# Patient Record
Sex: FEMALE | Race: BLACK OR AFRICAN AMERICAN
[De-identification: names, ages, dates, MRNs, and addresses within clinical notes are randomized per-mention and may not be internally consistent; named-entity substitution may affect disease eponyms.]

---

## 2018-04-20 ENCOUNTER — HOSPITAL ENCOUNTER (EMERGENCY)
Dept: HOSPITAL 17 - PHED | Age: 38
Discharge: HOME | End: 2018-04-20
Payer: MEDICAID

## 2018-04-20 VITALS — OXYGEN SATURATION: 98 %

## 2018-04-20 VITALS — WEIGHT: 191.8 LBS | HEIGHT: 62 IN | BODY MASS INDEX: 35.3 KG/M2

## 2018-04-20 VITALS
DIASTOLIC BLOOD PRESSURE: 64 MMHG | RESPIRATION RATE: 18 BRPM | HEART RATE: 89 BPM | OXYGEN SATURATION: 100 % | SYSTOLIC BLOOD PRESSURE: 110 MMHG

## 2018-04-20 VITALS
OXYGEN SATURATION: 100 % | RESPIRATION RATE: 16 BRPM | HEART RATE: 67 BPM | TEMPERATURE: 98.3 F | SYSTOLIC BLOOD PRESSURE: 120 MMHG | DIASTOLIC BLOOD PRESSURE: 59 MMHG

## 2018-04-20 VITALS — DIASTOLIC BLOOD PRESSURE: 68 MMHG | SYSTOLIC BLOOD PRESSURE: 128 MMHG

## 2018-04-20 DIAGNOSIS — Z79.899: ICD-10-CM

## 2018-04-20 DIAGNOSIS — N93.8: Primary | ICD-10-CM

## 2018-04-20 DIAGNOSIS — Z88.6: ICD-10-CM

## 2018-04-20 DIAGNOSIS — Z88.5: ICD-10-CM

## 2018-04-20 DIAGNOSIS — D64.9: ICD-10-CM

## 2018-04-20 LAB
ALBUMIN SERPL-MCNC: 3.4 GM/DL (ref 3.4–5)
ALP SERPL-CCNC: 63 U/L (ref 45–117)
ALT SERPL-CCNC: 27 U/L (ref 10–53)
AST SERPL-CCNC: 24 U/L (ref 15–37)
BASOPHILS # BLD AUTO: 0 TH/MM3 (ref 0–0.2)
BASOPHILS NFR BLD: 0.4 % (ref 0–2)
BILIRUB SERPL-MCNC: (no result) MG/DL (ref 0.2–1)
BUN SERPL-MCNC: 15 MG/DL (ref 7–18)
CALCIUM SERPL-MCNC: 8.6 MG/DL (ref 8.5–10.1)
CHLORIDE SERPL-SCNC: 107 MEQ/L (ref 98–107)
CREAT SERPL-MCNC: 0.71 MG/DL (ref 0.5–1)
EOSINOPHIL # BLD: 0.4 TH/MM3 (ref 0–0.4)
EOSINOPHIL NFR BLD: 4.9 % (ref 0–4)
ERYTHROCYTE [DISTWIDTH] IN BLOOD BY AUTOMATED COUNT: 17.4 % (ref 11.6–17.2)
GFR SERPLBLD BASED ON 1.73 SQ M-ARVRAT: 111 ML/MIN (ref 89–?)
GLUCOSE SERPL-MCNC: 85 MG/DL (ref 74–106)
HCO3 BLD-SCNC: 26.4 MEQ/L (ref 21–32)
HCT VFR BLD CALC: 26.2 % (ref 35–46)
HGB BLD-MCNC: 8.1 GM/DL (ref 11.6–15.3)
LYMPHOCYTES # BLD AUTO: 2.5 TH/MM3 (ref 1–4.8)
LYMPHOCYTES NFR BLD AUTO: 32.1 % (ref 9–44)
MCH RBC QN AUTO: 21.1 PG (ref 27–34)
MCHC RBC AUTO-ENTMCNC: 30.7 % (ref 32–36)
MCV RBC AUTO: 68.7 FL (ref 80–100)
MONOCYTE #: 0.6 TH/MM3 (ref 0–0.9)
MONOCYTES NFR BLD: 7.7 % (ref 0–8)
NEUTROPHILS # BLD AUTO: 4.2 TH/MM3 (ref 1.8–7.7)
NEUTROPHILS NFR BLD AUTO: 54.9 % (ref 16–70)
OVALOCYTES BLD QL SMEAR: (no result)
PLATELET # BLD: 455 TH/MM3 (ref 150–450)
PMV BLD AUTO: 7.4 FL (ref 7–11)
PROT SERPL-MCNC: 7.6 GM/DL (ref 6.4–8.2)
RBC # BLD AUTO: 3.82 MIL/MM3 (ref 4–5.3)
SODIUM SERPL-SCNC: 140 MEQ/L (ref 136–145)
WBC # BLD AUTO: 7.7 TH/MM3 (ref 4–11)

## 2018-04-20 PROCEDURE — 85025 COMPLETE CBC W/AUTO DIFF WBC: CPT

## 2018-04-20 PROCEDURE — 86850 RBC ANTIBODY SCREEN: CPT

## 2018-04-20 PROCEDURE — 99284 EMERGENCY DEPT VISIT MOD MDM: CPT

## 2018-04-20 PROCEDURE — 93005 ELECTROCARDIOGRAM TRACING: CPT

## 2018-04-20 PROCEDURE — 86901 BLOOD TYPING SEROLOGIC RH(D): CPT

## 2018-04-20 PROCEDURE — 86900 BLOOD TYPING SEROLOGIC ABO: CPT

## 2018-04-20 PROCEDURE — 80053 COMPREHEN METABOLIC PANEL: CPT

## 2018-04-20 PROCEDURE — 84703 CHORIONIC GONADOTROPIN ASSAY: CPT

## 2018-04-20 NOTE — PD
HPI


Chief Complaint:  Dizziness


Time Seen by Provider:  14:11


Travel History


International Travel<30 days:  No


Contact w/Intl Traveler<30days:  No


Traveled to known affect area:  No





History of Present Illness


HPI


38-year-old female patient with history of uterine fibroids, dysfunctional 

uterine bleeding, presents to the ER today because she has been bleeding for 

several days, was seen several days ago at [facility] and found to have 

anemia and low iron, started yesterday on iron pills and B12, but today 

starting get dizzy, states that she is still having vaginal bleeding and 

passing clots.  She denies any chest pains, shortness of breath, or other 

symptoms.





Modifying Factors: None


Associated Signs & Symptoms: Vaginal bleeding, lightheadedness


Risk Factors: Recent visit to the hospital for the same, low blood counts, low 

iron





PFSH


Past Medical History


Hx Anticoagulant Therapy:  No


Cardiovascular Problems:  No


Chemotherapy:  No


Cerebrovascular Accident:  No


Diabetes:  No


Diminished Hearing:  No


Reproductive:  Yes (fibroids)


Respiratory:  No


Immunizations Current:  Yes


Pregnant?:  Not Pregnant


Menopausal:  Yes


:  4


Para:  3


Miscarriage:  1


Tubal Ligation:  Yes





Past Surgical History


Hysterectomy:  No





Social History


Alcohol Use:  Yes (WEEKENDS)


Tobacco Use:  No


Substance Use:  No





Allergies-Medications


(Allergen,Severity, Reaction):  


Coded Allergies:  


     acetaminophen (Unverified  Allergy, Severe, Hallucinations, 18)


     hydrocodone (Unverified  Allergy, Severe, Hallucinations, 18)


Reported Meds & Prescriptions





Reported Meds & Active Scripts


Active


Reported


Vitamin B12 (Cyanocobalamin) 100 Mcg Tab 100 Mcg PO DAILY


Ferrous Sulfate 325 Mg (65 Mg Iron) Tablet 325 Mg PO DAILY








Review of Systems


Except as stated in HPI:  all other systems reviewed are Neg





Physical Exam


Narrative


GENERAL: Well-developed young -American female patient currently in mild 

distress.  Awake and oriented 3.


SKIN: Focused skin assessment warm/dry.


HEAD: Atraumatic. Normocephalic. 


EYES: Pupils equal and round. No scleral icterus. No injection or drainage. 


ENT: No nasal bleeding or discharge.  Mucous membranes pink and moist.


NECK: Trachea midline. No JVD.  Supple.


CARDIOVASCULAR: Regular rate and rhythm.  No murmur appreciated.


RESPIRATORY: No accessory muscle use. Clear to auscultation. Breath sounds 

equal bilaterally. 


GASTROINTESTINAL: Abdomen soft, non-tender, nondistended. Hepatic and splenic 

margins not palpable. 


MUSCULOSKELETAL: No obvious deformities. No clubbing.  No cyanosis.  No edema. 


NEUROLOGICAL: Awake and alert. No obvious cranial nerve deficits.  Motor 

grossly within normal limits. Normal speech.


PSYCHIATRIC: Appropriate mood and affect; insight and judgment normal.





Data


Data


Last Documented VS





Vital Signs








  Date Time  Temp Pulse Resp B/P (MAP) Pulse Ox O2 Delivery O2 Flow Rate FiO2


 


18 15:23  89 18 110/64 (79) 100 Room Air  


 


18 13:41 98.3       








Orders





 Orders


Electrocardiogram (18 14:07)


Ed Urine Pregnancytest Poc (18 14:07)


Complete Blood Count With Diff (18 14:07)


Comprehensive Metabolic Panel (18 14:07)


Ecg Monitoring (18 14:07)


Iv Access Insert/Monitor (18 14:07)


Oximetry (18 14:07)


Sodium Chloride 0.9% Flush (Ns Flush) (18 14:15)


Type And Screen (18 14:12)


Ed Discharge Order (18 15:53)





Labs





Laboratory Tests








Test


  18


14:25


 


White Blood Count 7.7 TH/MM3 


 


Red Blood Count 3.82 MIL/MM3 


 


Hemoglobin 8.1 GM/DL 


 


Hematocrit 26.2 % 


 


Mean Corpuscular Volume 68.7 FL 


 


Mean Corpuscular Hemoglobin 21.1 PG 


 


Mean Corpuscular Hemoglobin


Concent 30.7 % 


 


 


Red Cell Distribution Width 17.4 % 


 


Platelet Count 455 TH/MM3 


 


Mean Platelet Volume 7.4 FL 


 


Neutrophils (%) (Auto) 54.9 % 


 


Lymphocytes (%) (Auto) 32.1 % 


 


Monocytes (%) (Auto) 7.7 % 


 


Eosinophils (%) (Auto) 4.9 % 


 


Basophils (%) (Auto) 0.4 % 


 


Neutrophils # (Auto) 4.2 TH/MM3 


 


Lymphocytes # (Auto) 2.5 TH/MM3 


 


Monocytes # (Auto) 0.6 TH/MM3 


 


Eosinophils # (Auto) 0.4 TH/MM3 


 


Basophils # (Auto) 0.0 TH/MM3 


 


CBC Comment AUTO DIFF 


 


Blood Urea Nitrogen 15 MG/DL 


 


Creatinine 0.71 MG/DL 


 


Random Glucose 85 MG/DL 


 


Total Protein 7.6 GM/DL 


 


Albumin 3.4 GM/DL 


 


Calcium Level 8.6 MG/DL 


 


Alkaline Phosphatase 63 U/L 


 


Aspartate Amino Transf


(AST/SGOT) 24 U/L 


 


 


Alanine Aminotransferase


(ALT/SGPT) 27 U/L 


 


 


Total Bilirubin


  LESS THAN 0.1


MG/DL


 


Sodium Level 140 MEQ/L 


 


Potassium Level 4.0 MEQ/L 


 


Chloride Level 107 MEQ/L 


 


Carbon Dioxide Level 26.4 MEQ/L 


 


Anion Gap 7 MEQ/L 


 


Estimat Glomerular Filtration


Rate 111 ML/MIN 


 











MDM


Medical Decision Making


Medical Screen Exam Complete:  Yes


Emergency Medical Condition:  Yes


Medical Record Reviewed:  Yes


Interpretation(s)





Laboratory Tests








Test


  18


14:25


 


Red Blood Count


  3.82 MIL/MM3


(4.00-5.30)


 


Hemoglobin


  8.1 GM/DL


(11.6-15.3)


 


Hematocrit


  26.2 %


(35.0-46.0)


 


Mean Corpuscular Volume


  68.7 FL


(80.0-100.0)


 


Mean Corpuscular Hemoglobin


  21.1 PG


(27.0-34.0)


 


Mean Corpuscular Hemoglobin


Concent 30.7 %


(32.0-36.0)


 


Red Cell Distribution Width


  17.4 %


(11.6-17.2)


 


Platelet Count


  455 TH/MM3


(150-450)


 


Eosinophils (%) (Auto)


  4.9 %


(0.0-4.0)


 


Total Bilirubin


  LESS THAN 0.1


MG/DL








Differential Diagnosis


Dizziness: Symptomatic anemia versus metabolic issues versus anxiety


Narrative Course


Patient is not pregnant.  Lab work shows a hemoglobin of 8.1.  Vital signs are 

stable in the ER.  The rest of the lab work was fairly unremarkable.  The last 

hemoglobin we have on her was 11 but that was 3 years ago, and at this point 

she has just started therapy for anemia.  My plan would be to have her keep 

taking iron.  We will give her OCP in order to attempt to control the 

dysfunctional uterine bleeding.  We will have her follow-up closely with her OB/

GYN.  Return for any worsening in bleeding, or new symptoms as needed.  The 

plan has been discussed with her and she states understanding.





Diagnosis





 Primary Impression:  


 Dysfunctional uterine bleeding


 Additional Impression:  


 Anemia





***Additional Instructions:  


Stay on your iron pills.  Go see her OB/GYN this week.  Return for any 

worsening in bleeding or new symptoms as needed.


***Med/Other Pt SpecificInfo:  Prescription(s) given


Scripts


Norethindrone-Ethinyl Estradiol (Ortho-Novum ) 1-35 Mg-Mcg Tab


1 TAB PO DAILY for Birth Control, #1 PACK 0 Refills


   Prov: Dillon Kim MD         18


Disposition:  01 DISCHARGE HOME


Condition:  Stable











Dillon Kim MD 2018 14:14

## 2018-04-21 NOTE — EKG
Date Performed: 2018       Time Performed: 14:24:59

 

PTAGE:      38 years

 

EKG:      Sinus rhythm 

 

 NORMAL ECG Since the 

 

 PREVIOUS TRACING            , no significant change noted PREVIOUS TRACIN/15/2000 05.40

 

DOCTOR:   Skyler Stanton  Interpretating Date/Time  2018 16:50:38